# Patient Record
Sex: FEMALE | Race: OTHER | NOT HISPANIC OR LATINO
[De-identification: names, ages, dates, MRNs, and addresses within clinical notes are randomized per-mention and may not be internally consistent; named-entity substitution may affect disease eponyms.]

---

## 2024-01-10 PROBLEM — Z00.00 ENCOUNTER FOR PREVENTIVE HEALTH EXAMINATION: Status: ACTIVE | Noted: 2024-01-10

## 2024-01-11 ENCOUNTER — TRANSCRIPTION ENCOUNTER (OUTPATIENT)
Age: 34
End: 2024-01-11

## 2024-01-11 ENCOUNTER — APPOINTMENT (OUTPATIENT)
Dept: PLASTIC SURGERY | Facility: CLINIC | Age: 34
End: 2024-01-11
Payer: COMMERCIAL

## 2024-01-11 VITALS
DIASTOLIC BLOOD PRESSURE: 74 MMHG | HEIGHT: 66 IN | HEART RATE: 82 BPM | WEIGHT: 170 LBS | OXYGEN SATURATION: 98 % | BODY MASS INDEX: 27.32 KG/M2 | SYSTOLIC BLOOD PRESSURE: 121 MMHG

## 2024-01-11 DIAGNOSIS — Z78.9 OTHER SPECIFIED HEALTH STATUS: ICD-10-CM

## 2024-01-11 PROCEDURE — 99203 OFFICE O/P NEW LOW 30 MIN: CPT

## 2024-02-26 ENCOUNTER — OUTPATIENT (OUTPATIENT)
Dept: OUTPATIENT SERVICES | Facility: HOSPITAL | Age: 34
LOS: 1 days | End: 2024-02-26
Payer: COMMERCIAL

## 2024-02-26 VITALS
SYSTOLIC BLOOD PRESSURE: 118 MMHG | HEART RATE: 90 BPM | HEIGHT: 67 IN | OXYGEN SATURATION: 99 % | WEIGHT: 169.76 LBS | TEMPERATURE: 98 F | DIASTOLIC BLOOD PRESSURE: 82 MMHG | RESPIRATION RATE: 16 BRPM

## 2024-02-26 DIAGNOSIS — L30.4 ERYTHEMA INTERTRIGO: ICD-10-CM

## 2024-02-26 DIAGNOSIS — N62 HYPERTROPHY OF BREAST: ICD-10-CM

## 2024-02-26 DIAGNOSIS — K21.9 GASTRO-ESOPHAGEAL REFLUX DISEASE WITHOUT ESOPHAGITIS: ICD-10-CM

## 2024-02-26 DIAGNOSIS — M54.6 PAIN IN THORACIC SPINE: ICD-10-CM

## 2024-02-26 DIAGNOSIS — Z01.818 ENCOUNTER FOR OTHER PREPROCEDURAL EXAMINATION: ICD-10-CM

## 2024-02-26 DIAGNOSIS — M54.2 CERVICALGIA: ICD-10-CM

## 2024-02-26 LAB
HCT VFR BLD CALC: 38.3 % — SIGNIFICANT CHANGE UP (ref 34.5–45)
HGB BLD-MCNC: 12.9 G/DL — SIGNIFICANT CHANGE UP (ref 11.5–15.5)
MCHC RBC-ENTMCNC: 31.7 PG — SIGNIFICANT CHANGE UP (ref 27–34)
MCHC RBC-ENTMCNC: 33.7 GM/DL — SIGNIFICANT CHANGE UP (ref 32–36)
MCV RBC AUTO: 94.1 FL — SIGNIFICANT CHANGE UP (ref 80–100)
NRBC # BLD: 0 /100 WBCS — SIGNIFICANT CHANGE UP (ref 0–0)
PLATELET # BLD AUTO: 195 K/UL — SIGNIFICANT CHANGE UP (ref 150–400)
RBC # BLD: 4.07 M/UL — SIGNIFICANT CHANGE UP (ref 3.8–5.2)
RBC # FLD: 12.4 % — SIGNIFICANT CHANGE UP (ref 10.3–14.5)
WBC # BLD: 6.44 K/UL — SIGNIFICANT CHANGE UP (ref 3.8–10.5)
WBC # FLD AUTO: 6.44 K/UL — SIGNIFICANT CHANGE UP (ref 3.8–10.5)

## 2024-02-26 PROCEDURE — 85027 COMPLETE CBC AUTOMATED: CPT

## 2024-02-26 PROCEDURE — 36415 COLL VENOUS BLD VENIPUNCTURE: CPT

## 2024-02-26 PROCEDURE — G0463: CPT

## 2024-02-26 NOTE — H&P PST ADULT - HISTORY OF PRESENT ILLNESS
34yo female Bilingual - speaks and understand English with medical h/o GERD, reports large breasts w/pain and discomfort to upper and shoulder, and presents today for PST for scheduled Bilateral Breast Reduction on 3/22/2024 34yo female Bilingual - speaks and understand English with medical h/o GERD, reports large breasts w/pain and discomfort to upper and shoulder, and presents today for PST for scheduled Bilateral Breast Reduction on 3/22/2024  NOTE:  phone used

## 2024-02-26 NOTE — H&P PST ADULT - NSANTHSNORERD_ENT_A_CORE
no chest pain/no palpitations/no dyspnea on exertion/no orthopnea/no paroxysmal nocturnal dyspnea/no peripheral edema/no claudication
No

## 2024-02-26 NOTE — H&P PST ADULT - NSANTHOSAYNRD_GEN_A_CORE
neck 16inches/No. MAURIZIO screening performed.  STOP BANG Legend: 0-2 = LOW Risk; 3-4 = INTERMEDIATE Risk; 5-8 = HIGH Risk

## 2024-03-06 ENCOUNTER — APPOINTMENT (OUTPATIENT)
Dept: PLASTIC SURGERY | Facility: CLINIC | Age: 34
End: 2024-03-06
Payer: COMMERCIAL

## 2024-03-06 VITALS
HEART RATE: 81 BPM | HEIGHT: 66 IN | SYSTOLIC BLOOD PRESSURE: 123 MMHG | WEIGHT: 170 LBS | OXYGEN SATURATION: 99 % | BODY MASS INDEX: 27.32 KG/M2 | RESPIRATION RATE: 16 BRPM | DIASTOLIC BLOOD PRESSURE: 84 MMHG

## 2024-03-06 PROCEDURE — 99214 OFFICE O/P EST MOD 30 MIN: CPT

## 2024-03-06 RX ORDER — OXYCODONE AND ACETAMINOPHEN 5; 325 MG/1; MG/1
5-325 TABLET ORAL
Qty: 20 | Refills: 0 | Status: ACTIVE | COMMUNITY
Start: 2024-03-06 | End: 1900-01-01

## 2024-03-06 RX ORDER — IBUPROFEN 800 MG/1
800 TABLET, FILM COATED ORAL EVERY 6 HOURS
Qty: 20 | Refills: 0 | Status: ACTIVE | COMMUNITY
Start: 2024-03-06 | End: 1900-01-01

## 2024-03-21 ENCOUNTER — TRANSCRIPTION ENCOUNTER (OUTPATIENT)
Age: 34
End: 2024-03-21

## 2024-03-21 ENCOUNTER — APPOINTMENT (OUTPATIENT)
Dept: PLASTIC SURGERY | Facility: CLINIC | Age: 34
End: 2024-03-21

## 2024-03-21 RX ORDER — OMEPRAZOLE 10 MG/1
1 CAPSULE, DELAYED RELEASE ORAL
Refills: 0 | DISCHARGE

## 2024-03-21 NOTE — ASU PATIENT PROFILE, ADULT - FALL HARM RISK - UNIVERSAL INTERVENTIONS
Bed in lowest position, wheels locked, appropriate side rails in place/Call bell, personal items and telephone in reach/Instruct patient to call for assistance before getting out of bed or chair/Non-slip footwear when patient is out of bed/[place] to call system/Physically safe environment - no spills, clutter or unnecessary equipment/Purposeful Proactive Rounding/Room/bathroom lighting operational, light cord in reach

## 2024-03-22 ENCOUNTER — TRANSCRIPTION ENCOUNTER (OUTPATIENT)
Age: 34
End: 2024-03-22

## 2024-03-22 ENCOUNTER — RESULT REVIEW (OUTPATIENT)
Age: 34
End: 2024-03-22

## 2024-03-22 ENCOUNTER — OUTPATIENT (OUTPATIENT)
Dept: OUTPATIENT SERVICES | Facility: HOSPITAL | Age: 34
LOS: 1 days | End: 2024-03-22
Payer: COMMERCIAL

## 2024-03-22 ENCOUNTER — APPOINTMENT (OUTPATIENT)
Dept: PLASTIC SURGERY | Facility: HOSPITAL | Age: 34
End: 2024-03-22
Payer: COMMERCIAL

## 2024-03-22 VITALS
WEIGHT: 169.76 LBS | OXYGEN SATURATION: 98 % | HEIGHT: 67 IN | HEART RATE: 82 BPM | RESPIRATION RATE: 13 BRPM | DIASTOLIC BLOOD PRESSURE: 81 MMHG | TEMPERATURE: 97 F | SYSTOLIC BLOOD PRESSURE: 117 MMHG

## 2024-03-22 VITALS
DIASTOLIC BLOOD PRESSURE: 83 MMHG | TEMPERATURE: 98 F | OXYGEN SATURATION: 96 % | HEART RATE: 87 BPM | RESPIRATION RATE: 16 BRPM | SYSTOLIC BLOOD PRESSURE: 125 MMHG

## 2024-03-22 DIAGNOSIS — N62 HYPERTROPHY OF BREAST: ICD-10-CM

## 2024-03-22 DIAGNOSIS — L30.4 ERYTHEMA INTERTRIGO: ICD-10-CM

## 2024-03-22 DIAGNOSIS — M54.2 CERVICALGIA: ICD-10-CM

## 2024-03-22 DIAGNOSIS — Z98.891 HISTORY OF UTERINE SCAR FROM PREVIOUS SURGERY: Chronic | ICD-10-CM

## 2024-03-22 DIAGNOSIS — M54.6 PAIN IN THORACIC SPINE: ICD-10-CM

## 2024-03-22 PROCEDURE — 19318 BREAST REDUCTION: CPT | Mod: RT

## 2024-03-22 PROCEDURE — 19318 BREAST REDUCTION: CPT | Mod: 50

## 2024-03-22 PROCEDURE — C1889: CPT

## 2024-03-22 PROCEDURE — 88305 TISSUE EXAM BY PATHOLOGIST: CPT

## 2024-03-22 PROCEDURE — 88305 TISSUE EXAM BY PATHOLOGIST: CPT | Mod: 26

## 2024-03-22 DEVICE — ARISTA 3GR: Type: IMPLANTABLE DEVICE | Site: BILATERAL | Status: FUNCTIONAL

## 2024-03-22 RX ORDER — ONDANSETRON 8 MG/1
4 TABLET, FILM COATED ORAL ONCE
Refills: 0 | Status: COMPLETED | OUTPATIENT
Start: 2024-03-22 | End: 2024-03-22

## 2024-03-22 RX ORDER — SODIUM CHLORIDE 9 MG/ML
1000 INJECTION, SOLUTION INTRAVENOUS
Refills: 0 | Status: DISCONTINUED | OUTPATIENT
Start: 2024-03-22 | End: 2024-03-22

## 2024-03-22 RX ORDER — HYDROMORPHONE HYDROCHLORIDE 2 MG/ML
0.5 INJECTION INTRAMUSCULAR; INTRAVENOUS; SUBCUTANEOUS
Refills: 0 | Status: DISCONTINUED | OUTPATIENT
Start: 2024-03-22 | End: 2024-03-22

## 2024-03-22 RX ORDER — ONDANSETRON 8 MG/1
4 TABLET, FILM COATED ORAL ONCE
Refills: 0 | Status: DISCONTINUED | OUTPATIENT
Start: 2024-03-22 | End: 2024-03-22

## 2024-03-22 RX ORDER — OXYCODONE HYDROCHLORIDE 5 MG/1
5 TABLET ORAL EVERY 6 HOURS
Refills: 0 | Status: DISCONTINUED | OUTPATIENT
Start: 2024-03-22 | End: 2024-03-22

## 2024-03-22 RX ORDER — PANTOPRAZOLE SODIUM 20 MG/1
1 TABLET, DELAYED RELEASE ORAL
Refills: 0 | DISCHARGE

## 2024-03-22 RX ORDER — HYDROMORPHONE HYDROCHLORIDE 2 MG/ML
0.5 INJECTION INTRAMUSCULAR; INTRAVENOUS; SUBCUTANEOUS ONCE
Refills: 0 | Status: DISCONTINUED | OUTPATIENT
Start: 2024-03-22 | End: 2024-03-22

## 2024-03-22 RX ADMIN — HYDROMORPHONE HYDROCHLORIDE 0.5 MILLIGRAM(S): 2 INJECTION INTRAMUSCULAR; INTRAVENOUS; SUBCUTANEOUS at 10:31

## 2024-03-22 RX ADMIN — HYDROMORPHONE HYDROCHLORIDE 0.5 MILLIGRAM(S): 2 INJECTION INTRAMUSCULAR; INTRAVENOUS; SUBCUTANEOUS at 11:00

## 2024-03-22 RX ADMIN — ONDANSETRON 4 MILLIGRAM(S): 8 TABLET, FILM COATED ORAL at 13:48

## 2024-03-22 RX ADMIN — HYDROMORPHONE HYDROCHLORIDE 0.5 MILLIGRAM(S): 2 INJECTION INTRAMUSCULAR; INTRAVENOUS; SUBCUTANEOUS at 11:15

## 2024-03-22 RX ADMIN — ONDANSETRON 4 MILLIGRAM(S): 8 TABLET, FILM COATED ORAL at 11:57

## 2024-03-22 RX ADMIN — HYDROMORPHONE HYDROCHLORIDE 0.5 MILLIGRAM(S): 2 INJECTION INTRAMUSCULAR; INTRAVENOUS; SUBCUTANEOUS at 11:01

## 2024-03-22 RX ADMIN — SODIUM CHLORIDE 50 MILLILITER(S): 9 INJECTION, SOLUTION INTRAVENOUS at 05:58

## 2024-03-22 NOTE — ASU DISCHARGE PLAN (ADULT/PEDIATRIC) - CARE PROVIDER_API CALL
Braydon Granda  Plastic Surgery  [address] [place] [address], [address]  [place] [address]  Phone: [phone]  Fax: [phone]  Scheduled Appointment: 03/27/2024

## 2024-03-22 NOTE — BRIEF OPERATIVE NOTE - FIRST ASSIST
New Problem - On Tx. Reviewed sleep study and download compliance data with patient. Supplies and parts as needed for her machine. These are medically necessary. Limit caffeine use after 3pm. Discussed adjusting tube heat or wrapping the tubing. PA/NP...

## 2024-03-22 NOTE — ASU DISCHARGE PLAN (ADULT/PEDIATRIC) - NS MD DC FALL RISK RISK
For information on Fall & Injury Prevention, visit: [email or web address] OR  [email or web address] OR  https://www.cdc.gov/steadi/patient.html

## 2024-03-22 NOTE — ASU DISCHARGE PLAN (ADULT/PEDIATRIC) - CALL YOUR DOCTOR IF YOU HAVE ANY OF THE FOLLOWING:
Bleeding that does not stop/Swelling that gets worse/Pain not relieved by Medications Bleeding that does not stop/Swelling that gets worse/Pain not relieved by Medications/Fever greater than (need to indicate Fahrenheit or Celsius)/Wound/Surgical Site with redness, or foul smelling discharge or pus/Nausea and vomiting that does not stop/Inability to tolerate liquids or foods

## 2024-03-27 ENCOUNTER — APPOINTMENT (OUTPATIENT)
Dept: PLASTIC SURGERY | Facility: CLINIC | Age: 34
End: 2024-03-27
Payer: COMMERCIAL

## 2024-03-27 VITALS
BODY MASS INDEX: 27.32 KG/M2 | HEIGHT: 66 IN | DIASTOLIC BLOOD PRESSURE: 82 MMHG | SYSTOLIC BLOOD PRESSURE: 117 MMHG | WEIGHT: 170 LBS | HEART RATE: 100 BPM | OXYGEN SATURATION: 100 % | RESPIRATION RATE: 16 BRPM

## 2024-03-27 PROBLEM — N62 HYPERTROPHY OF BREAST: Chronic | Status: ACTIVE | Noted: 2024-02-26

## 2024-03-27 PROBLEM — K21.9 GASTRO-ESOPHAGEAL REFLUX DISEASE WITHOUT ESOPHAGITIS: Chronic | Status: ACTIVE | Noted: 2024-02-26

## 2024-03-27 LAB — SURGICAL PATHOLOGY STUDY: SIGNIFICANT CHANGE UP

## 2024-03-27 PROCEDURE — 99024 POSTOP FOLLOW-UP VISIT: CPT

## 2024-04-01 ENCOUNTER — APPOINTMENT (OUTPATIENT)
Dept: PLASTIC SURGERY | Facility: CLINIC | Age: 34
End: 2024-04-01
Payer: COMMERCIAL

## 2024-04-01 VITALS
WEIGHT: 170 LBS | HEIGHT: 66 IN | BODY MASS INDEX: 27.32 KG/M2 | DIASTOLIC BLOOD PRESSURE: 82 MMHG | OXYGEN SATURATION: 100 % | SYSTOLIC BLOOD PRESSURE: 129 MMHG | RESPIRATION RATE: 16 BRPM | HEART RATE: 77 BPM | TEMPERATURE: 97 F

## 2024-04-01 PROCEDURE — 99024 POSTOP FOLLOW-UP VISIT: CPT

## 2024-04-01 RX ORDER — TRIAMCINOLONE ACETONIDE 0.5 MG/G
0.05 OINTMENT TOPICAL TWICE DAILY
Qty: 15 | Refills: 0 | Status: ACTIVE | COMMUNITY
Start: 2024-04-01 | End: 1900-01-01

## 2024-04-17 ENCOUNTER — APPOINTMENT (OUTPATIENT)
Dept: PLASTIC SURGERY | Facility: CLINIC | Age: 34
End: 2024-04-17
Payer: COMMERCIAL

## 2024-04-17 VITALS
OXYGEN SATURATION: 100 % | HEIGHT: 66 IN | HEART RATE: 77 BPM | DIASTOLIC BLOOD PRESSURE: 75 MMHG | BODY MASS INDEX: 27.32 KG/M2 | SYSTOLIC BLOOD PRESSURE: 108 MMHG | WEIGHT: 170 LBS | TEMPERATURE: 98 F

## 2024-04-17 PROCEDURE — 99024 POSTOP FOLLOW-UP VISIT: CPT

## 2024-05-13 ENCOUNTER — APPOINTMENT (OUTPATIENT)
Dept: PLASTIC SURGERY | Facility: CLINIC | Age: 34
End: 2024-05-13
Payer: COMMERCIAL

## 2024-05-13 VITALS
HEIGHT: 66 IN | DIASTOLIC BLOOD PRESSURE: 85 MMHG | BODY MASS INDEX: 27.32 KG/M2 | RESPIRATION RATE: 16 BRPM | HEART RATE: 88 BPM | OXYGEN SATURATION: 99 % | WEIGHT: 170 LBS | SYSTOLIC BLOOD PRESSURE: 139 MMHG

## 2024-05-13 PROCEDURE — 99024 POSTOP FOLLOW-UP VISIT: CPT

## 2024-05-20 ENCOUNTER — APPOINTMENT (OUTPATIENT)
Dept: PLASTIC SURGERY | Facility: CLINIC | Age: 34
End: 2024-05-20

## 2024-06-10 ENCOUNTER — APPOINTMENT (OUTPATIENT)
Dept: PLASTIC SURGERY | Facility: CLINIC | Age: 34
End: 2024-06-10
Payer: COMMERCIAL

## 2024-06-10 VITALS
TEMPERATURE: 97.9 F | SYSTOLIC BLOOD PRESSURE: 121 MMHG | WEIGHT: 170 LBS | HEIGHT: 66 IN | HEART RATE: 75 BPM | DIASTOLIC BLOOD PRESSURE: 81 MMHG | OXYGEN SATURATION: 100 % | BODY MASS INDEX: 27.32 KG/M2

## 2024-06-10 DIAGNOSIS — Q83.1 ACCESSORY BREAST: ICD-10-CM

## 2024-06-10 DIAGNOSIS — N62 HYPERTROPHY OF BREAST: ICD-10-CM

## 2024-06-10 PROCEDURE — 99024 POSTOP FOLLOW-UP VISIT: CPT

## 2024-10-21 ENCOUNTER — OUTPATIENT (OUTPATIENT)
Dept: OUTPATIENT SERVICES | Facility: HOSPITAL | Age: 34
LOS: 1 days | End: 2024-10-21
Payer: COMMERCIAL

## 2024-10-21 VITALS
SYSTOLIC BLOOD PRESSURE: 120 MMHG | WEIGHT: 171.52 LBS | RESPIRATION RATE: 16 BRPM | HEART RATE: 90 BPM | OXYGEN SATURATION: 99 % | HEIGHT: 65.5 IN | DIASTOLIC BLOOD PRESSURE: 81 MMHG | TEMPERATURE: 99 F

## 2024-10-21 DIAGNOSIS — Z01.818 ENCOUNTER FOR OTHER PREPROCEDURAL EXAMINATION: ICD-10-CM

## 2024-10-21 DIAGNOSIS — Z98.891 HISTORY OF UTERINE SCAR FROM PREVIOUS SURGERY: Chronic | ICD-10-CM

## 2024-10-21 DIAGNOSIS — N64.4 MASTODYNIA: ICD-10-CM

## 2024-10-21 DIAGNOSIS — Q83.1 ACCESSORY BREAST: ICD-10-CM

## 2024-10-21 DIAGNOSIS — L91.0 HYPERTROPHIC SCAR: ICD-10-CM

## 2024-10-21 DIAGNOSIS — T81.31XS DISRUPTION OF EXTERNAL OPERATION (SURGICAL) WOUND, NOT ELSEWHERE CLASSIFIED, SEQUELA: ICD-10-CM

## 2024-10-21 DIAGNOSIS — Z98.890 OTHER SPECIFIED POSTPROCEDURAL STATES: Chronic | ICD-10-CM

## 2024-10-21 DIAGNOSIS — N64.1 FAT NECROSIS OF BREAST: ICD-10-CM

## 2024-10-21 LAB
HCT VFR BLD CALC: 38.7 % — SIGNIFICANT CHANGE UP (ref 34.5–45)
HGB BLD-MCNC: 12.6 G/DL — SIGNIFICANT CHANGE UP (ref 11.5–15.5)
MCHC RBC-ENTMCNC: 30.7 PG — SIGNIFICANT CHANGE UP (ref 27–34)
MCHC RBC-ENTMCNC: 32.6 GM/DL — SIGNIFICANT CHANGE UP (ref 32–36)
MCV RBC AUTO: 94.4 FL — SIGNIFICANT CHANGE UP (ref 80–100)
NRBC # BLD: 0 /100 WBCS — SIGNIFICANT CHANGE UP (ref 0–0)
PLATELET # BLD AUTO: 218 K/UL — SIGNIFICANT CHANGE UP (ref 150–400)
RBC # BLD: 4.1 M/UL — SIGNIFICANT CHANGE UP (ref 3.8–5.2)
RBC # FLD: 14.6 % — HIGH (ref 10.3–14.5)
WBC # BLD: 5.34 K/UL — SIGNIFICANT CHANGE UP (ref 3.8–10.5)
WBC # FLD AUTO: 5.34 K/UL — SIGNIFICANT CHANGE UP (ref 3.8–10.5)

## 2024-10-21 PROCEDURE — G0463: CPT

## 2024-10-21 PROCEDURE — 36415 COLL VENOUS BLD VENIPUNCTURE: CPT

## 2024-10-21 PROCEDURE — 85027 COMPLETE CBC AUTOMATED: CPT

## 2024-10-21 NOTE — H&P PST ADULT - NSANTHOSAYNRD_GEN_A_CORE
No. MAURIZIO screening performed.  STOP BANG Legend: 0-2 = LOW Risk; 3-4 = INTERMEDIATE Risk; 5-8 = HIGH Risk

## 2024-10-21 NOTE — H&P PST ADULT - HISTORY OF PRESENT ILLNESS
35 y/o female with medical h/o GERD, s/p Bilateral Breast Reduction on 3/22/2024 who presents to [facility] for upcoming excision of b/l accessory axillary tissue, revision of scar, b/l breast reduction on 11/7/24.

## 2024-10-21 NOTE — H&P PST ADULT - GENERAL
Med Rec complete per Pt  Allergies Reviewed    Pt reports NOT taking anticoagulant in the last 14 days       negative

## 2024-10-21 NOTE — H&P PST ADULT - NSICDXPASTMEDICALHX_GEN_ALL_CORE_FT
PAST MEDICAL HISTORY:  Fat necrosis of breast     GERD (gastroesophageal reflux disease)     Hypertrophic scar     Large breasts

## 2024-10-21 NOTE — H&P PST ADULT - DIAGNOSTICS LABS REVIEWED
No
Transportation Risk (There is always a risk of traffic delays resulting in deterioration of condition.)

## 2024-11-01 RX ORDER — OXYCODONE AND ACETAMINOPHEN 5; 325 MG/1; MG/1
5-325 TABLET ORAL
Qty: 20 | Refills: 0 | Status: ACTIVE | COMMUNITY
Start: 2024-11-01 | End: 1900-01-01

## 2024-11-01 RX ORDER — IBUPROFEN 800 MG/1
800 TABLET, FILM COATED ORAL EVERY 6 HOURS
Qty: 20 | Refills: 0 | Status: ACTIVE | COMMUNITY
Start: 2024-11-01 | End: 1900-01-01

## 2024-11-05 PROBLEM — L91.0 HYPERTROPHIC SCAR: Chronic | Status: ACTIVE | Noted: 2024-10-21

## 2024-11-05 PROBLEM — N64.1 FAT NECROSIS OF BREAST: Chronic | Status: ACTIVE | Noted: 2024-10-21

## 2024-11-07 ENCOUNTER — OUTPATIENT (OUTPATIENT)
Dept: OUTPATIENT SERVICES | Facility: HOSPITAL | Age: 34
LOS: 1 days | End: 2024-11-07
Payer: COMMERCIAL

## 2024-11-07 ENCOUNTER — TRANSCRIPTION ENCOUNTER (OUTPATIENT)
Age: 34
End: 2024-11-07

## 2024-11-07 ENCOUNTER — RESULT REVIEW (OUTPATIENT)
Age: 34
End: 2024-11-07

## 2024-11-07 ENCOUNTER — APPOINTMENT (OUTPATIENT)
Dept: PLASTIC SURGERY | Facility: HOSPITAL | Age: 34
End: 2024-11-07
Payer: COMMERCIAL

## 2024-11-07 VITALS
RESPIRATION RATE: 13 BRPM | HEART RATE: 79 BPM | DIASTOLIC BLOOD PRESSURE: 79 MMHG | HEIGHT: 65.5 IN | WEIGHT: 171.52 LBS | TEMPERATURE: 98 F | OXYGEN SATURATION: 100 % | SYSTOLIC BLOOD PRESSURE: 117 MMHG

## 2024-11-07 VITALS
DIASTOLIC BLOOD PRESSURE: 71 MMHG | RESPIRATION RATE: 16 BRPM | TEMPERATURE: 97 F | SYSTOLIC BLOOD PRESSURE: 109 MMHG | OXYGEN SATURATION: 100 % | HEART RATE: 69 BPM

## 2024-11-07 DIAGNOSIS — L91.0 HYPERTROPHIC SCAR: ICD-10-CM

## 2024-11-07 DIAGNOSIS — Q83.1 ACCESSORY BREAST: ICD-10-CM

## 2024-11-07 DIAGNOSIS — T81.31XS DISRUPTION OF EXTERNAL OPERATION (SURGICAL) WOUND, NOT ELSEWHERE CLASSIFIED, SEQUELA: ICD-10-CM

## 2024-11-07 DIAGNOSIS — Z98.891 HISTORY OF UTERINE SCAR FROM PREVIOUS SURGERY: Chronic | ICD-10-CM

## 2024-11-07 DIAGNOSIS — Z98.890 OTHER SPECIFIED POSTPROCEDURAL STATES: Chronic | ICD-10-CM

## 2024-11-07 DIAGNOSIS — N64.4 MASTODYNIA: ICD-10-CM

## 2024-11-07 DIAGNOSIS — N64.1 FAT NECROSIS OF BREAST: ICD-10-CM

## 2024-11-07 PROCEDURE — 88304 TISSUE EXAM BY PATHOLOGIST: CPT | Mod: 26

## 2024-11-07 PROCEDURE — 88304 TISSUE EXAM BY PATHOLOGIST: CPT

## 2024-11-07 PROCEDURE — C1889: CPT

## 2024-11-07 PROCEDURE — C9399: CPT

## 2024-11-07 PROCEDURE — 19120 REMOVAL OF BREAST LESION: CPT | Mod: 50

## 2024-11-07 PROCEDURE — 19301 PARTIAL MASTECTOMY: CPT | Mod: LT

## 2024-11-07 PROCEDURE — 19380 REVJ RECONSTRUCTED BREAST: CPT | Mod: LT

## 2024-11-07 DEVICE — CLIP APPLIER ETHICON LIGACLIP 11.5" MEDIUM: Type: IMPLANTABLE DEVICE | Site: BILATERAL | Status: FUNCTIONAL

## 2024-11-07 DEVICE — ARISTA 3GR: Type: IMPLANTABLE DEVICE | Site: BILATERAL | Status: FUNCTIONAL

## 2024-11-07 DEVICE — CLIP APPLIER ETHICON LIGACLIP 9 3/8" SMALL: Type: IMPLANTABLE DEVICE | Site: BILATERAL | Status: FUNCTIONAL

## 2024-11-07 RX ORDER — OXYCODONE AND ACETAMINOPHEN 7.5; 325 MG/1; MG/1
1 TABLET ORAL EVERY 4 HOURS
Refills: 0 | Status: DISCONTINUED | OUTPATIENT
Start: 2024-11-07 | End: 2024-11-07

## 2024-11-07 RX ORDER — ONDANSETRON HYDROCHLORIDE 2 MG/ML
4 INJECTION, SOLUTION INTRAMUSCULAR; INTRAVENOUS ONCE
Refills: 0 | Status: DISCONTINUED | OUTPATIENT
Start: 2024-11-07 | End: 2024-11-07

## 2024-11-07 RX ORDER — HYDROMORPHONE HCL/0.9% NACL/PF 6 MG/30 ML
1 PATIENT CONTROLLED ANALGESIA SYRINGE INTRAVENOUS ONCE
Refills: 0 | Status: DISCONTINUED | OUTPATIENT
Start: 2024-11-07 | End: 2024-11-07

## 2024-11-07 RX ORDER — HYDROMORPHONE HCL/0.9% NACL/PF 6 MG/30 ML
0.5 PATIENT CONTROLLED ANALGESIA SYRINGE INTRAVENOUS
Refills: 0 | Status: DISCONTINUED | OUTPATIENT
Start: 2024-11-07 | End: 2024-11-07

## 2024-11-07 RX ORDER — DIPHENHYDRAMINE HCL 12.5MG/5ML
12.5 ELIXIR ORAL ONCE
Refills: 0 | Status: COMPLETED | OUTPATIENT
Start: 2024-11-07 | End: 2024-11-07

## 2024-11-07 RX ORDER — ONDANSETRON HYDROCHLORIDE 2 MG/ML
4 INJECTION, SOLUTION INTRAMUSCULAR; INTRAVENOUS EVERY 6 HOURS
Refills: 0 | Status: ACTIVE | OUTPATIENT
Start: 2024-11-07 | End: 2025-10-06

## 2024-11-07 RX ADMIN — Medication 12.5 MILLIGRAM(S): at 11:30

## 2024-11-07 RX ADMIN — Medication 50 MILLILITER(S): at 06:38

## 2024-11-07 RX ADMIN — ONDANSETRON HYDROCHLORIDE 4 MILLIGRAM(S): 2 INJECTION, SOLUTION INTRAMUSCULAR; INTRAVENOUS at 11:09

## 2024-11-07 NOTE — ASU DISCHARGE PLAN (ADULT/PEDIATRIC) - PROCEDURE
Excision of bilateral accessory axillary tissue hypertrophy, revision of bilateral breast reduction.

## 2024-11-07 NOTE — ASU DISCHARGE PLAN (ADULT/PEDIATRIC) - FINANCIAL ASSISTANCE
[facility] provides services at a reduced cost to those who are determined to be eligible through [facility]’s financial assistance program. Information regarding [facility]’s financial assistance program can be found by going to [email or web address] or by calling [phone].

## 2024-11-07 NOTE — ASU DISCHARGE PLAN (ADULT/PEDIATRIC) - ASU DC SPECIAL INSTRUCTIONSFT
No heavy lifting or strenuous activity  You may shower and get incisions wet is 48 hrs  Please monitor drain outputs  Do not start Ibuprofen for 48 hrs  You may take the Percocet as needed for breakthrough pain

## 2024-11-07 NOTE — ASU PATIENT PROFILE, ADULT - DOES PATIENT HAVE ADVANCE DIRECTIVE
[Patient reported mammogram was normal] : Patient reported mammogram was normal [With Significant Other] : lives with significant other [] :  [Fully functional (bathing, dressing, toileting, transferring, walking, feeding)] : Fully functional (bathing, dressing, toileting, transferring, walking, feeding) [Fully functional (using the telephone, shopping, preparing meals, housekeeping, doing laundry, using] : Fully functional and needs no help or supervision to perform IADLs (using the telephone, shopping, preparing meals, housekeeping, doing laundry, using transportation, managing medications and managing finances) [MammogramDate] : 6/21 No

## 2024-11-07 NOTE — BRIEF OPERATIVE NOTE - NSICDXBRIEFPROCEDURE_GEN_ALL_CORE_FT
PROCEDURES:  Revision of breast reduction 07-Nov-2024 07:26:53  Ambika Mcclellan  Excision of bilateral axillary breast tissue 07-Nov-2024 07:27:08  Ambika Mcclellan

## 2024-11-08 ENCOUNTER — NON-APPOINTMENT (OUTPATIENT)
Age: 34
End: 2024-11-08

## 2024-11-11 ENCOUNTER — NON-APPOINTMENT (OUTPATIENT)
Age: 34
End: 2024-11-11

## 2024-11-12 ENCOUNTER — NON-APPOINTMENT (OUTPATIENT)
Age: 34
End: 2024-11-12

## 2024-11-13 ENCOUNTER — APPOINTMENT (OUTPATIENT)
Dept: PLASTIC SURGERY | Facility: CLINIC | Age: 34
End: 2024-11-13
Payer: COMMERCIAL

## 2024-11-13 VITALS
WEIGHT: 165 LBS | SYSTOLIC BLOOD PRESSURE: 130 MMHG | OXYGEN SATURATION: 100 % | RESPIRATION RATE: 16 BRPM | TEMPERATURE: 97.9 F | HEART RATE: 77 BPM | DIASTOLIC BLOOD PRESSURE: 89 MMHG | HEIGHT: 65 IN | BODY MASS INDEX: 27.49 KG/M2

## 2024-11-13 PROCEDURE — 99024 POSTOP FOLLOW-UP VISIT: CPT

## 2024-11-14 LAB — SURGICAL PATHOLOGY STUDY: SIGNIFICANT CHANGE UP

## 2024-11-15 ENCOUNTER — APPOINTMENT (OUTPATIENT)
Dept: PLASTIC SURGERY | Facility: CLINIC | Age: 34
End: 2024-11-15
Payer: COMMERCIAL

## 2024-11-15 VITALS
HEART RATE: 99 BPM | WEIGHT: 165 LBS | OXYGEN SATURATION: 100 % | TEMPERATURE: 98 F | BODY MASS INDEX: 27.49 KG/M2 | SYSTOLIC BLOOD PRESSURE: 133 MMHG | DIASTOLIC BLOOD PRESSURE: 87 MMHG | RESPIRATION RATE: 16 BRPM | HEIGHT: 65 IN

## 2024-11-15 DIAGNOSIS — N62 HYPERTROPHY OF BREAST: ICD-10-CM

## 2024-11-15 PROCEDURE — 99024 POSTOP FOLLOW-UP VISIT: CPT

## 2024-11-20 ENCOUNTER — APPOINTMENT (OUTPATIENT)
Dept: PLASTIC SURGERY | Facility: CLINIC | Age: 34
End: 2024-11-20
Payer: COMMERCIAL

## 2024-11-20 VITALS
SYSTOLIC BLOOD PRESSURE: 120 MMHG | OXYGEN SATURATION: 100 % | WEIGHT: 165 LBS | HEART RATE: 82 BPM | BODY MASS INDEX: 27.49 KG/M2 | HEIGHT: 65 IN | TEMPERATURE: 98.2 F | RESPIRATION RATE: 16 BRPM | DIASTOLIC BLOOD PRESSURE: 82 MMHG

## 2024-11-20 PROCEDURE — 99024 POSTOP FOLLOW-UP VISIT: CPT

## 2024-11-25 ENCOUNTER — APPOINTMENT (OUTPATIENT)
Dept: PLASTIC SURGERY | Facility: CLINIC | Age: 34
End: 2024-11-25

## 2024-11-25 DIAGNOSIS — Q83.1 ACCESSORY BREAST: ICD-10-CM

## 2024-12-04 ENCOUNTER — APPOINTMENT (OUTPATIENT)
Dept: PLASTIC SURGERY | Facility: CLINIC | Age: 34
End: 2024-12-04

## 2025-08-09 ENCOUNTER — NON-APPOINTMENT (OUTPATIENT)
Age: 35
End: 2025-08-09

## 2025-08-11 ENCOUNTER — APPOINTMENT (OUTPATIENT)
Dept: PLASTIC SURGERY | Facility: CLINIC | Age: 35
End: 2025-08-11

## (undated) DEVICE — SUT QUILL MONODERM 2-0 30CM 18MM

## (undated) DEVICE — VENODYNE/SCD SLEEVE CALF MEDIUM

## (undated) DEVICE — PREP CHLORAPREP HI-LITE ORANGE 26ML

## (undated) DEVICE — NDL COUNTER FOAM AND ADHESIVE 40-70

## (undated) DEVICE — DRSG TEGADERM 3.5X6"

## (undated) DEVICE — SOLIDIFIER CANN EXPRESS 3K

## (undated) DEVICE — PLASTIC SOLUTION BOWL 160Z

## (undated) DEVICE — DRSG DERMABOND 0.7ML

## (undated) DEVICE — SOL IRR POUR H2O 1500ML

## (undated) DEVICE — CATH IV SAFE INSYTE 14G X 1.75" (ORANGE)

## (undated) DEVICE — SUT MONOCRYL 3-0 27" PS-2 UNDYED

## (undated) DEVICE — SUT SOFSILK 2-0 18" C-15

## (undated) DEVICE — DRAPE SPLIT SHEET 77" X 108"

## (undated) DEVICE — GLV 7.5 PROTEXIS (WHITE)

## (undated) DEVICE — BLADE SCALPEL SAFETYLOCK #15

## (undated) DEVICE — CANISTER SUCTION LID GUARD 3000CC

## (undated) DEVICE — LABELS BLANK W PEN

## (undated) DEVICE — SUT POLYSORB 3-0 30" V-20 UNDYED

## (undated) DEVICE — ELCTR BOVIE PENCIL BLADE 10FT

## (undated) DEVICE — DRSG DERMABOND PRINEO 60CM

## (undated) DEVICE — Device

## (undated) DEVICE — DRAIN RESERVOIR FOR JACKSON PRATT 100CC CARDINAL

## (undated) DEVICE — PACK MINOR

## (undated) DEVICE — POSITIONER STRAP ARMBOARD VELCRO TS-30

## (undated) DEVICE — WARMING BLANKET LOWER ADULT

## (undated) DEVICE — DRSG MAMMARY SUPPORT LG SIZE 4

## (undated) DEVICE — DRSG XEROFORM 5 X 9"

## (undated) DEVICE — SUT VICRYL 3-0 18" PS-2 UNDYED

## (undated) DEVICE — DRAPE 1/2 SHEET 40X57"

## (undated) DEVICE — SOL IRR POUR NS 0.9% 500ML

## (undated) DEVICE — BLADE SCALPEL SAFETYLOCK #10

## (undated) DEVICE — TUBING HI-VAC PSI-TEC STERILE

## (undated) DEVICE — BLADE SURGICAL #10 CARBON

## (undated) DEVICE — DRAPE INSTRUMENT POUCH 6.75" X 11"

## (undated) DEVICE — DRAPE SPLIT SHEET 77" X 120"

## (undated) DEVICE — MARKING PEN W RULER

## (undated) DEVICE — GLV 6.5 PROTEXIS (WHITE)

## (undated) DEVICE — DRSG DERMABOND MINI

## (undated) DEVICE — NDL COUNTER FOAM AND MAGNET 40-70

## (undated) DEVICE — SUT POLYSORB 2-0 36" GS-21 UNDYED

## (undated) DEVICE — SUT MONOSOFT 2-0 18" C-15

## (undated) DEVICE — DRAPE IOBAN 10" X 8"

## (undated) DEVICE — POSITIONER FOAM HEAD CRADLE (PINK)

## (undated) DEVICE — LAP PAD 18 X 18"

## (undated) DEVICE — STAPLER SKIN VISI-STAT 35 WIDE

## (undated) DEVICE — BLADE SURGICAL #15 CARBON

## (undated) DEVICE — STAPLER SKIN MULTI DIRECTION W35

## (undated) DEVICE — DRAPE 3/4 SHEET 52X76"

## (undated) DEVICE — BOWL STERILE 32OZ BLUE

## (undated) DEVICE — SPECIMEN CONTAINER PET

## (undated) DEVICE — BRA PINK LG 36-39"